# Patient Record
Sex: FEMALE | Race: WHITE | HISPANIC OR LATINO | Employment: FULL TIME | URBAN - METROPOLITAN AREA
[De-identification: names, ages, dates, MRNs, and addresses within clinical notes are randomized per-mention and may not be internally consistent; named-entity substitution may affect disease eponyms.]

---

## 2023-01-07 ENCOUNTER — HOSPITAL ENCOUNTER (OUTPATIENT)
Dept: RADIOLOGY | Facility: MEDICAL CENTER | Age: 48
End: 2023-01-07
Attending: NURSE PRACTITIONER

## 2023-01-07 ENCOUNTER — OFFICE VISIT (OUTPATIENT)
Dept: URGENT CARE | Facility: PHYSICIAN GROUP | Age: 48
End: 2023-01-07

## 2023-01-07 VITALS
DIASTOLIC BLOOD PRESSURE: 82 MMHG | WEIGHT: 214.2 LBS | HEART RATE: 102 BPM | BODY MASS INDEX: 42.05 KG/M2 | OXYGEN SATURATION: 96 % | SYSTOLIC BLOOD PRESSURE: 146 MMHG | TEMPERATURE: 99 F | RESPIRATION RATE: 16 BRPM | HEIGHT: 60 IN

## 2023-01-07 DIAGNOSIS — R26.89 ANTALGIC GAIT: ICD-10-CM
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
## 2023-01-07 DIAGNOSIS — R03.0 ELEVATED BLOOD PRESSURE READING: ICD-10-CM

## 2023-01-07 DIAGNOSIS — S80.02XA CONTUSION OF LEFT KNEE, INITIAL ENCOUNTER: ICD-10-CM

## 2023-01-07 DIAGNOSIS — M25.562 LEFT LATERAL KNEE PAIN: ICD-10-CM

## 2023-01-07 PROCEDURE — 99203 OFFICE O/P NEW LOW 30 MIN: CPT | Performed by: NURSE PRACTITIONER

## 2023-01-07 PROCEDURE — 73564 X-RAY EXAM KNEE 4 OR MORE: CPT | Mod: LT

## 2023-01-07 RX ORDER — DICLOFENAC SODIUM 30 MG/G
1 GEL TOPICAL 2 TIMES DAILY PRN
Qty: 100 G | Refills: 0 | Status: SHIPPED | OUTPATIENT
Start: 2023-01-07

## 2023-01-07 RX ORDER — LISINOPRIL 10 MG/1
10 TABLET ORAL
COMMUNITY
Start: 2022-11-08

## 2023-01-07 RX ORDER — ATORVASTATIN CALCIUM 40 MG/1
40 TABLET, FILM COATED ORAL
COMMUNITY
Start: 2022-11-08

## 2023-01-07 RX ORDER — DICLOFENAC SODIUM 75 MG/1
75 TABLET, DELAYED RELEASE ORAL
Qty: 60 TABLET | Refills: 0 | Status: SHIPPED | OUTPATIENT
Start: 2023-01-07

## 2023-01-07 NOTE — LETTER
January 7, 2023    To Whom It May Concern:         This is confirmation that Perri Booth attended her scheduled appointment with MARLON Burton on 1/07/23.  Please excuse her from work on the dates of 1/4 to 1/8 due to an acute injury.         If you have any questions please do not hesitate to call me at the phone number listed below.    Sincerely,          JO BurtonRAlexN.  [phone]

## 2023-03-01 ENCOUNTER — TELEPHONE (OUTPATIENT)
Dept: RADIOLOGY | Facility: MEDICAL CENTER | Age: 48
End: 2023-03-01
Payer: OTHER GOVERNMENT

## 2023-03-01 NOTE — TELEPHONE ENCOUNTER
Encounter Date: 2022       History     Chief Complaint   Patient presents with    Eye Problem     Redness to eye x 3-4 days. Denies any trauma to the eye.  No drainage, no changes in vision.      Presents with discomfort on her right eye for the last few days. States noticed a possible rupture vessel, is red and hemorrhagic, but look like is getting worse. Denies injury , visual changes, pain or taking blood thinners.    The history is provided by the patient.   Review of patient's allergies indicates:  No Known Allergies  Past Medical History:   Diagnosis Date    Arthritis      Past Surgical History:   Procedure Laterality Date     SECTION      EPIDURAL STEROID INJECTION INTO LUMBAR SPINE N/A 2022    Procedure: Injection-steroid-epidural-lumbar;  Surgeon: Chet Neal MD;  Location: [facility] MAIN OR;  Service: Pain Management;  Laterality: N/A;    EPIDURAL STEROID INJECTION INTO LUMBAR SPINE N/A 2022    Procedure: Injection-steroid-epidural-lumbar;  Surgeon: Chet Neal MD;  Location: [facility] MAIN OR;  Service: Pain Management;  Laterality: N/A;    INJECTION OF ANESTHETIC AGENT INTO SACROILIAC JOINT Left 2022    Procedure: BLOCK, SACROILIAC JOINT;  Surgeon: Chet Neal MD;  Location: [facility] MAIN OR;  Service: Pain Management;  Laterality: Left;    INJECTION OF ANESTHETIC AGENT INTO SACROILIAC JOINT Right 10/12/2022    Procedure: BLOCK, SACROILIAC JOINT;  Surgeon: Chet Neal MD;  Location: [facility] MAIN OR;  Service: Pain Management;  Laterality: Right;     Family History   Problem Relation Age of Onset    Lung cancer Father      Social History     Tobacco Use    Smoking status: Never    Smokeless tobacco: Never   Substance Use Topics    Alcohol use: Yes    Drug use: Never     Review of Systems   Constitutional:  Negative for fever.   HENT:  Negative for facial swelling and sore throat.    Eyes:  Positive for redness. Negative for photophobia, pain, discharge,  Left ms to have pt call [phone] to schedule screening mammogram (order from [facility]). Patient will be a [facility] pt.    itching and visual disturbance.   Respiratory:  Negative for cough and shortness of breath.    Cardiovascular:  Negative for chest pain.   Gastrointestinal:  Negative for nausea.   Genitourinary:  Negative for dysuria.   Musculoskeletal:  Negative for back pain.   Skin:  Negative for rash.   Neurological:  Negative for weakness.   Hematological:  Does not bruise/bleed easily.   All other systems reviewed and are negative.    Physical Exam     Initial Vitals [11/20/22 1823]   BP Pulse Resp Temp SpO2   120/86 91 18 97.9 °F (36.6 °C) --      MAP       --         Physical Exam    Nursing note and vitals reviewed.  Constitutional: She appears well-developed and well-nourished. No distress.   HENT:   Head: Normocephalic and atraumatic.   Mouth/Throat: Oropharynx is clear and moist.   Eyes: EOM and lids are normal. Pupils are equal, round, and reactive to light. Right conjunctiva has a hemorrhage.   Subconjunctival hemorrhage on Rt eye, lateral aspect;    IOP OD 12; OS 14; Visual acuity with correction 20/20 B/L   Neck:   Normal range of motion.  Cardiovascular:  Normal rate, regular rhythm, normal heart sounds and intact distal pulses.           Pulmonary/Chest: Breath sounds normal.   Musculoskeletal:         General: No edema. Normal range of motion.      Cervical back: Normal range of motion.     Neurological: She is alert and oriented to person, place, and time. She has normal strength.   Skin: Skin is warm and dry. No rash noted.   Psychiatric: Her behavior is normal.       ED Course   Procedures  Labs Reviewed - No data to display       Imaging Results    None          Medications - No data to display                           Clinical Impression:   Final diagnoses:  [H11.31] Subconjunctival bleed, right (Primary)      ED Disposition Condition    Discharge Stable          ED Prescriptions       Medication Sig Dispense Start Date End Date Auth. Provider    dextran 70-hypromellose (TEARS) ophthalmic solution Place 1  drop into the right eye every 4 (four) hours. 15 mL 11/20/2022 -- Wilbur Mccann MD          Follow-up Information       Follow up With Specialties Details Why Contact Info    Ty Hager MD Internal Medicine In 1 week  [address] [facility] [address]  [phone]      [facility] Emergency Medicine  If symptoms worsen [address] [facility] [address] [phone]    [facility] Ophthalmology Schedule an appointment as soon as possible for a visit in 1 week  [address] [place] [address] [phone]             Wilbur Mccann MD  11/20/22 7594

## 2023-03-07 ENCOUNTER — HOSPITAL ENCOUNTER (OUTPATIENT)
Dept: RADIOLOGY | Facility: MEDICAL CENTER | Age: 48
End: 2023-03-07
Payer: OTHER GOVERNMENT

## 2023-03-07 DIAGNOSIS — Z12.31 VISIT FOR SCREENING MAMMOGRAM: ICD-10-CM

## 2023-03-07 PROCEDURE — 77063 BREAST TOMOSYNTHESIS BI: CPT

## 2023-03-14 ENCOUNTER — HOSPITAL ENCOUNTER (OUTPATIENT)
Dept: RADIOLOGY | Facility: MEDICAL CENTER | Age: 48
End: 2023-03-14
Payer: OTHER GOVERNMENT

## 2023-03-14 DIAGNOSIS — R92.8 ABNORMAL MAMMOGRAM: ICD-10-CM

## 2023-03-14 PROCEDURE — 76642 ULTRASOUND BREAST LIMITED: CPT | Mod: RT

## 2023-08-02 ENCOUNTER — TELEPHONE (OUTPATIENT)
Dept: RADIOLOGY | Facility: MEDICAL CENTER | Age: 48
End: 2023-08-02
Payer: OTHER GOVERNMENT

## 2023-08-02 NOTE — TELEPHONE ENCOUNTER
Left msg to have pt call [facility] at [phone] to verona a diagnostic mammo w/ ultrasound (PRN). Per [facility] order. Pt will be a [facility] pt if she does not have insurance; need to ask [facility] questions.

## 2023-09-18 ENCOUNTER — HOSPITAL ENCOUNTER (OUTPATIENT)
Dept: RADIOLOGY | Facility: MEDICAL CENTER | Age: 48
End: 2023-09-18
Payer: OTHER GOVERNMENT

## 2023-09-18 DIAGNOSIS — R92.8 ABNORMAL MAMMOGRAM: ICD-10-CM

## 2023-09-18 PROCEDURE — 76642 ULTRASOUND BREAST LIMITED: CPT | Mod: RT

## 2023-09-18 PROCEDURE — G0279 TOMOSYNTHESIS, MAMMO: HCPCS

## 2024-04-14 ENCOUNTER — HOSPITAL ENCOUNTER (EMERGENCY)
Facility: MEDICAL CENTER | Age: 49
End: 2024-04-15
Attending: STUDENT IN AN ORGANIZED HEALTH CARE EDUCATION/TRAINING PROGRAM

## 2024-04-14 DIAGNOSIS — R06.2 WHEEZING: ICD-10-CM

## 2024-04-14 DIAGNOSIS — S29.011A MUSCLE STRAIN OF CHEST WALL, INITIAL ENCOUNTER: ICD-10-CM

## 2024-04-14 DIAGNOSIS — J10.1 INFLUENZA B: ICD-10-CM

## 2024-04-14 LAB
FLUAV RNA SPEC QL NAA+PROBE: NEGATIVE
FLUBV RNA SPEC QL NAA+PROBE: POSITIVE
RSV RNA SPEC QL NAA+PROBE: NEGATIVE
SARS-COV-2 RNA RESP QL NAA+PROBE: NOTDETECTED

## 2024-04-14 PROCEDURE — 99284 EMERGENCY DEPT VISIT MOD MDM: CPT

## 2024-04-14 PROCEDURE — 700101 HCHG RX REV CODE 250: Performed by: STUDENT IN AN ORGANIZED HEALTH CARE EDUCATION/TRAINING PROGRAM

## 2024-04-14 PROCEDURE — 0241U HCHG SARS-COV-2 COVID-19 NFCT DS RESP RNA 4 TRGT ED POC: CPT

## 2024-04-14 PROCEDURE — 700102 HCHG RX REV CODE 250 W/ 637 OVERRIDE(OP): Performed by: STUDENT IN AN ORGANIZED HEALTH CARE EDUCATION/TRAINING PROGRAM

## 2024-04-14 PROCEDURE — A9270 NON-COVERED ITEM OR SERVICE: HCPCS | Performed by: STUDENT IN AN ORGANIZED HEALTH CARE EDUCATION/TRAINING PROGRAM

## 2024-04-14 RX ORDER — METHOCARBAMOL 500 MG/1
1000 TABLET, FILM COATED ORAL ONCE
Status: COMPLETED | OUTPATIENT
Start: 2024-04-15 | End: 2024-04-14

## 2024-04-14 RX ORDER — NAPROXEN 500 MG/1
500 TABLET ORAL ONCE
Status: COMPLETED | OUTPATIENT
Start: 2024-04-15 | End: 2024-04-14

## 2024-04-14 RX ORDER — LIDOCAINE 4 G/G
1 PATCH TOPICAL EVERY 24 HOURS
Status: DISCONTINUED | OUTPATIENT
Start: 2024-04-15 | End: 2024-04-15 | Stop reason: HOSPADM

## 2024-04-14 RX ORDER — ACETAMINOPHEN 500 MG
1000 TABLET ORAL ONCE
Status: COMPLETED | OUTPATIENT
Start: 2024-04-14 | End: 2024-04-14

## 2024-04-14 RX ORDER — SODIUM CHLORIDE 9 MG/ML
INJECTION, SOLUTION INTRAVENOUS ONCE
Status: DISCONTINUED | OUTPATIENT
Start: 2024-04-14 | End: 2024-04-14

## 2024-04-14 RX ADMIN — METHOCARBAMOL 1000 MG: 500 TABLET ORAL at 23:54

## 2024-04-14 RX ADMIN — LIDOCAINE 1 PATCH: 4 PATCH TOPICAL at 23:54

## 2024-04-14 RX ADMIN — ALBUTEROL SULFATE 2.5 MG: 2.5 SOLUTION RESPIRATORY (INHALATION) at 23:54

## 2024-04-14 RX ADMIN — NAPROXEN 500 MG: 500 TABLET ORAL at 23:54

## 2024-04-14 RX ADMIN — ACETAMINOPHEN 1000 MG: 500 TABLET, FILM COATED ORAL at 23:53

## 2024-04-15 VITALS
SYSTOLIC BLOOD PRESSURE: 135 MMHG | WEIGHT: 223.55 LBS | HEART RATE: 91 BPM | TEMPERATURE: 97.9 F | BODY MASS INDEX: 42.21 KG/M2 | HEIGHT: 61 IN | RESPIRATION RATE: 17 BRPM | DIASTOLIC BLOOD PRESSURE: 77 MMHG | OXYGEN SATURATION: 93 %

## 2024-04-15 RX ORDER — ALBUTEROL SULFATE 90 UG/1
2 AEROSOL, METERED RESPIRATORY (INHALATION) EVERY 6 HOURS PRN
Qty: 8.5 G | Refills: 0 | Status: SHIPPED | OUTPATIENT
Start: 2024-04-15

## 2024-04-15 RX ORDER — METHOCARBAMOL 750 MG/1
1500 TABLET, FILM COATED ORAL 3 TIMES DAILY
Qty: 20 TABLET | Refills: 0 | Status: SHIPPED | OUTPATIENT
Start: 2024-04-15

## 2024-04-15 RX ORDER — NAPROXEN 375 MG/1
375 TABLET ORAL 2 TIMES DAILY WITH MEALS
Qty: 14 TABLET | Refills: 0 | Status: SHIPPED | OUTPATIENT
Start: 2024-04-15 | End: 2024-04-22

## 2024-04-15 RX ORDER — LIDOCAINE 50 MG/G
1 PATCH TOPICAL EVERY 24 HOURS
Qty: 10 PATCH | Refills: 0 | Status: SHIPPED | OUTPATIENT
Start: 2024-04-15

## 2024-04-15 NOTE — ED PROVIDER NOTES
"ED Provider Note    CHIEF COMPLAINT  Chief Complaint   Patient presents with    Flu Like Symptoms     Worsening productive cough with subjective fever for a few days. Reports of taking advil for it.    Flank Pain     Left rib/flank pain since morning. States pain exacerbated by coughing. Denies dysuria, hematuria or history of kidney stones. Does report \"foamy\" urine.        EXTERNAL RECORDS REVIEWED  Outpatient Notes office visit on 1/29/2024 for type 2 diabetes    HPI/ROS  LIMITATION TO HISTORY   Select: : None  OUTSIDE HISTORIAN(S):    Perri Booth is a 48 y.o. female who presents with runny nose, sore throat, cough, sore throat for 8 days.  Patient had subjective fever.  Patient also endorses left sided chest wall pain after repeated coughing at about day 7.  Patient is coming in primarily for that chest wall pain from coughing.  Patient has no history of asthma.  Patient has no dysuria or hematuria or lower abdominal pain.    PAST MEDICAL HISTORY       SURGICAL HISTORY  patient denies any surgical history    FAMILY HISTORY  No family history on file.    SOCIAL HISTORY  Social History     Tobacco Use    Smoking status: Never    Smokeless tobacco: Never   Vaping Use    Vaping Use: Never used   Substance and Sexual Activity    Alcohol use: Not Currently    Drug use: Never    Sexual activity: Not on file       CURRENT MEDICATIONS  Home Medications       Reviewed by Sukhi Cadet R.N. (Registered Nurse) on 04/14/24 at 2233  Med List Status: Partial     Medication Last Dose Status   atorvastatin (LIPITOR) 40 MG Tab  Active   diclofenac DR (VOLTAREN) 75 MG Tablet Delayed Response  Active   diclofenac sodium 3 % Gel  Active   lisinopril (PRINIVIL) 10 MG Tab  Active                    ALLERGIES  Not on File    PHYSICAL EXAM  VITAL SIGNS: BP (!) 178/109   Pulse 86   Temp 36.3 °C (97.3 °F) (Temporal)   Resp 18   Ht 1.549 m (5' 1\")   Wt 101 kg (223 lb 8.7 oz)   SpO2 92%   BMI 42.24 kg/m²    Vitals and " nursing note reviewed.   Constitutional:       Comments: Patient is lying in bed supine, pleasant, conversant, speaking in complete sentences   HENT:      Head: Normocephalic and atraumatic.   Mild posterior pharyngeal erythema without exudates  No lymphadenopathy  TMs clear bilaterally  Eyes:      Extraocular Movements: Extraocular movements intact.      Conjunctiva/sclera: Conjunctivae normal.      Pupils: Pupils are equal, round, and reactive to light.   Cardiovascular:      Pulses: Normal pulses.      Comments:   Pulmonary:      Effort: Pulmonary effort is normal. No respiratory distress.   Skin expiratory wheezing in the upper lung fields.  Abdominal:      Comments: Abdomen is soft, non-tender, non-distended, non-rigid, no rebound, guarding, masses, no McBurney's point tenderness, no peritoneal signs, negative Rovsing sign, negative Huang sign.  No CVA tenderness to palpation. Benign abdomen.   Musculoskeletal:      Left-sided chest wall mid axillary tenderness to palpation without crepitus     General: No swelling. Normal range of motion.      Cervical back: Normal range of motion. No rigidity.   Skin:     General: Skin is warm and dry.      Capillary Refill: Capillary refill takes less than 2 seconds.   Neurological:      Mental Status: Alert.         Radiologist interpretation:  No orders to display       COURSE & MEDICAL DECISION MAKING    ASSESSMENT, COURSE AND PLAN  Care Narrative: Patient has no meningismus, acting appropriately, no confusion, meningitis versus encephalitis is inconsistent with patient presentation at this time.  Patient has no posterior oropharyngeal exudates, no lymphadenopathy, strep pharyngitis is inconsistent with patient presentation at this time.  Tympanic membranes have no evidence of air-fluid levels, exudates, loss of light reflex, perforation or purulent drainage, otitis media is inconsistent with patient presentation at this time.  Lungs are clear to auscultation, no  hypoxia, no evidence of rales, pneumonia is inconsistent with patient presentation at this time.  Abdomen is soft, nontender, nonrigid, acute intra-abdominal process such as intussusception or appendicitis is inconsistent with patient presentation at this time. Patient's vital signs are within normal limits, sepsis is inconsistent with patient presentation at this time. I believe it is likely that this patient is suffering from influenza B per respiratory viral panel result.  Patient will be given some albuterol to see if this helps her symptoms following exam findings of expiratory wheezing.  Robaxin, lidocaine patch, naproxen, Tylenol for chest wall pain.  Disposition pending symptom control.    Electronically signed by: Hernando Limon M.D., 4/14/2024 11:39 PM    Influenza B positive.  Patient tolerating oral intake.  Patient given albuterol for wheezing.  Robaxin, Naprosyn, Tylenol, lidocaine patch for symptom control.  Patient counseled to return for difficulty breathing.    Repeat physical exam benign.  I doubt any serious emergency process at this time.  Patient and/or family, friends given strict return precautions for worsening symptoms and care instructions. They have demonstrated understanding of discharge instructions through teach back mechanism. Advised PCP follow-up in 1-2 days.  Patient/family/friend expresses understanding and agrees to plan.    This dictation has been created using voice recognition software. I am continuously working with the software to minimize the number of voice recognition errors and I have made every attempt to manually correct the errors within my dictation. However errors  related to this voice recognition software may still exist and should be interpreted within the appropriate context.     Electronically signed by: Hernando Limon M.D., 4/15/2024 12:05 AM      DISPOSITION AND DISCUSSIONS    Escalation of care considered, and ultimately not  performed:diagnostic imaging    Decision tools and prescription drugs considered including, but not limited to: Antivirals not indicated due to duration of symptoms the patient is having .    FINAL DIAGNOSIS  1. Influenza B    2. Wheezing    3. Muscle strain of chest wall, initial encounter           Electronically signed by: Hernando Limon M.D., 4/14/2024 11:38 PM

## 2024-04-15 NOTE — ED TRIAGE NOTES
"Perri Booth  48 y.o. female  Chief Complaint   Patient presents with    Flu Like Symptoms     Worsening productive cough with subjective fever for a few days. Reports of taking advil for it.    Flank Pain     Left rib/flank pain since morning. States pain exacerbated by coughing. Denies dysuria, hematuria or history of kidney stones. Does report \"foamy\" urine.        Pt ambulatory to triage with steady gait for above complaint with daughter. Daughter prefers to translate for patient.      Pt is GCS 15, speaking in full sentences, follows commands and responds appropriately to questions. Resp are even and unlabored.     COVID protocol ordered. Pt placed in ED lobby. Pt educated on triage process. Pt encouraged to alert staff for any changes.       Vitals:    04/14/24 2220   BP: (!) 178/109   Pulse: (!) 104   Resp: 18   Temp: 36.3 °C (97.3 °F)   SpO2: 94%     "

## 2024-04-15 NOTE — ED NOTES
Pt signed and given d/c papers. Discussed x4 rx sent to pref pharmacy and f/u if needed. Also reviewed OTC if needed for pain/fever and rest/fluids for flu mgmt. Pt denies further questions/concerns. Pt ambulated out of the dept w/ steady gait, A&O x4 w/ all belongings to lobby exit